# Patient Record
Sex: MALE | Race: WHITE | NOT HISPANIC OR LATINO | Employment: FULL TIME | URBAN - METROPOLITAN AREA
[De-identification: names, ages, dates, MRNs, and addresses within clinical notes are randomized per-mention and may not be internally consistent; named-entity substitution may affect disease eponyms.]

---

## 2024-01-08 ENCOUNTER — OFFICE VISIT (OUTPATIENT)
Dept: CARDIOLOGY | Facility: CLINIC | Age: 64
End: 2024-01-08
Payer: COMMERCIAL

## 2024-01-08 VITALS
HEART RATE: 55 BPM | DIASTOLIC BLOOD PRESSURE: 80 MMHG | BODY MASS INDEX: 29.8 KG/M2 | WEIGHT: 220 LBS | SYSTOLIC BLOOD PRESSURE: 124 MMHG | HEIGHT: 72 IN

## 2024-01-08 DIAGNOSIS — I10 PRIMARY HYPERTENSION: ICD-10-CM

## 2024-01-08 DIAGNOSIS — I44.0 FIRST DEGREE AV BLOCK: ICD-10-CM

## 2024-01-08 DIAGNOSIS — E78.2 MIXED HYPERLIPIDEMIA: ICD-10-CM

## 2024-01-08 DIAGNOSIS — I25.810 CORONARY ARTERY DISEASE INVOLVING CORONARY BYPASS GRAFT OF NATIVE HEART WITHOUT ANGINA PECTORIS: Primary | ICD-10-CM

## 2024-01-08 PROBLEM — R35.1 BPH ASSOCIATED WITH NOCTURIA: Status: ACTIVE | Noted: 2023-03-21

## 2024-01-08 PROBLEM — N40.1 BPH ASSOCIATED WITH NOCTURIA: Status: ACTIVE | Noted: 2023-03-21

## 2024-01-08 PROBLEM — K40.90 RIGHT INGUINAL HERNIA: Status: ACTIVE | Noted: 2023-03-21

## 2024-01-08 PROBLEM — E55.9 VITAMIN D DEFICIENCY: Status: ACTIVE | Noted: 2022-03-10

## 2024-01-08 PROCEDURE — 93000 ELECTROCARDIOGRAM COMPLETE: CPT | Performed by: NURSE PRACTITIONER

## 2024-01-08 PROCEDURE — 99214 OFFICE O/P EST MOD 30 MIN: CPT | Performed by: NURSE PRACTITIONER

## 2024-01-08 RX ORDER — BISOPROLOL FUMARATE 10 MG/1
10 TABLET, FILM COATED ORAL DAILY
Qty: 90 TABLET | Refills: 3 | Status: SHIPPED | OUTPATIENT
Start: 2024-01-08

## 2024-01-08 RX ORDER — ATORVASTATIN CALCIUM 40 MG/1
40 TABLET, FILM COATED ORAL NIGHTLY
Qty: 90 TABLET | Refills: 3 | Status: SHIPPED | OUTPATIENT
Start: 2024-01-08

## 2024-01-08 RX ORDER — AMLODIPINE BESYLATE 2.5 MG/1
2.5 TABLET ORAL DAILY
Qty: 90 TABLET | Refills: 3 | Status: SHIPPED | OUTPATIENT
Start: 2024-01-08

## 2024-01-08 RX ORDER — CHLORTHALIDONE 25 MG/1
25 TABLET ORAL DAILY
Qty: 90 TABLET | Refills: 3 | Status: SHIPPED | OUTPATIENT
Start: 2024-01-08

## 2024-01-08 RX ORDER — LOSARTAN POTASSIUM 100 MG/1
100 TABLET ORAL DAILY
Qty: 90 TABLET | Refills: 3 | Status: SHIPPED | OUTPATIENT
Start: 2024-01-08

## 2024-01-08 NOTE — PROGRESS NOTES
Date of Office Visit: 2024  Encounter Provider: PALAK Baltazar  Place of Service: [facility] CARDIOLOGY  Patient Name: ASHLIE Bob  :1960  Primary Cardiologist: Dr. Bobby Trujillo    Chief Complaint   Patient presents with    Follow-up    Coronary Artery Disease   :     HPI: ASHLIE Bob is a 63 y.o. male who presents today for annual cardiac follow-up visit.  I have reviewed his medical records.    He has been diagnosed with hypertension and hyperlipidemia.  In 2013, he was diagnosed with multivessel coronary artery disease and underwent CABG x3.  He had postoperative atrial fibrillation and underwent electrical cardioversion with a short course of amiodarone.      In , while working in [place] he had chest pain and had a normal stress test completed in [place].     Dr. Trujillo is changed him from HCTZ to chlorthalidone due to hypertension and leg swelling which helped greatly.  In , nebivolol was changed to bisoprolol due to cost.  In , amlodipine was decreased due to leg swelling and low blood pressure.    In 2019, echocardiogram showed the following: EF, mild mitral annular calcification, and trace mitral valve regurgitation.     In 2022, myocardial perfusion study was normal.  Dr. Trujillo recommended a stress test every 5 years.    He presents today for his annual cardiac follow-up visit.  He denies chest pain, shortness of air, palpitations, dizziness, syncope, or bleeding.  He reports some mild pedal edema at times.  Blood pressure and heart rate are normal.  I reviewed his last blood work.    Past Medical History:   Diagnosis Date    BPH associated with nocturia 2023    CAD (coronary artery disease)     Unstable angina 2013, CABG at [facility] in [place] by Dr. Pruitt.  CABG x 3, LIMA-LAD, SVG-PDA, left radial - diagonal.     Hyperlipidemia     Hypertension     Postoperative atrial fibrillation     after CABG, treated  with cardioversion and a brief course of amiodarone    Right inguinal hernia 03/21/2023       Past Surgical History:   Procedure Laterality Date    CARDIAC CATHETERIZATION      CORONARY ARTERY BYPASS GRAFT      HIP SURGERY Bilateral 2016       Social History     Socioeconomic History    Marital status: Single   Tobacco Use    Smoking status: Never    Smokeless tobacco: Never   Vaping Use    Vaping Use: Never used   Substance and Sexual Activity    Alcohol use: Yes     Alcohol/week: 7.0 standard drinks of alcohol     Types: 7 Cans of beer per week     Comment: PER WK/ ONE PER DAY.     Drug use: No    Sexual activity: Yes     Partners: Female     Birth control/protection: Surgical       Family History   Problem Relation Age of Onset    Hypertension Mother     Hyperlipidemia Father     Heart attack Brother 50    Atrial fibrillation Brother     Heart attack Maternal Grandfather     Heart attack Paternal Grandfather        The following portion of the patient's history were reviewed and updated as appropriate: past medical history, past surgical history, past social history, past family history, allergies, current medications, and problem list.    Review of Systems   Constitutional: Negative.   Cardiovascular:  Positive for leg swelling.   Respiratory: Negative.     Hematologic/Lymphatic: Negative.    Neurological: Negative.        Allergies   Allergen Reactions    Adhesive Tape Hives     Pt had reaction to post-op dressing after Right STACEY         Current Outpatient Medications:     amLODIPine (NORVASC) 2.5 MG tablet, Take 1 tablet by mouth Daily., Disp: 90 tablet, Rfl: 3    aspirin 81 MG tablet, Take 1 tablet by mouth Daily., Disp: , Rfl:     atorvastatin (LIPITOR) 40 MG tablet, Take 1 tablet by mouth Every Night., Disp: 90 tablet, Rfl: 3    bisoprolol (ZEBeta) 10 MG tablet, Take 1 tablet by mouth Daily., Disp: 90 tablet, Rfl: 3    chlorthalidone (HYGROTON) 25 MG tablet, Take 1 tablet by mouth Daily., Disp: 90 tablet,  "Rfl: 3    losartan (COZAAR) 100 MG tablet, Take 1 tablet by mouth Daily., Disp: 90 tablet, Rfl: 3    tamsulosin (FLOMAX) 0.4 MG capsule 24 hr capsule, Daily., Disp: , Rfl:          Objective:     Vitals:    01/08/24 1135   BP: 124/80   BP Location: Left arm   Patient Position: Sitting   Cuff Size: Adult   Pulse: 55   Weight: 99.8 kg (220 lb)   Height: 182.9 cm (72.01\")     Body mass index is 29.83 kg/m².    PHYSICAL EXAM:    Vitals Reviewed.   General Appearance: No acute distress, well developed and well nourished.   HENT: No hearing loss noted.    Respiratory: No signs of respiratory distress. Respiration rhythm and depth normal.  Clear to auscultation.   Cardiovascular:  Jugular Venous Pressure: Normal  Heart Rate and Rhythm: Normal rhythm. Bradycardic. Heart Sounds: Normal S1 and S2. No S3 or S4 noted.  Murmurs: No murmurs noted. No rubs, thrills, or gallops.   Lower Extremities: No edema noted.  Musculoskeletal: Normal movement of extremities.  Skin: General appearance normal.    Psychiatric: Patient alert and oriented to person, place, and time. Speech and behavior appropriate. Normal mood and affect.       ECG 12 Lead    Date/Time: 1/8/2024 11:36 AM  Performed by: Adrianna Gaytan APRN    Authorized by: Adrianna Gaytan APRN  Comparison: compared with previous ECG from 12/20/2022  Similar to previous ECG  Rhythm: sinus bradycardia  Rate: bradycardic  BPM: 55  Conduction: incomplete right bundle branch block, left anterior fascicular block and 1st degree AV block  ST Segments: ST segments normal  T Waves: T waves normal  QRS axis: normal    Clinical impression: abnormal EKG            Assessment:       Diagnosis Plan   1. Coronary artery disease involving coronary bypass graft of native heart without angina pectoris        2. Primary hypertension        3. Mixed hyperlipidemia        4. First degree AV block               Plan:       1.  Coronary Artery Disease: s/p CABG x3. Normal stress test in 2022. " Continue aspirin and atorvastatin.  Denies angina.  Repeat stress test in 2027, unless new symptoms occur.    2. Hypertension: Blood pressure well controlled.     3.  Hyperlipidemia: Continue atorvastatin.     4. First degree AV block and incomplete RBBB/LAFB noted on EKG.     5.  Follow-up with Dr. Trujillo in 1 year.    As always, it has been a pleasure to participate in your patient's care. Thank you.         Sincerely,         PALAK Witt  [facility] Cardiology      Dictated utilizing Dragon Dictation

## 2024-04-24 RX ORDER — AMLODIPINE BESYLATE 2.5 MG/1
2.5 TABLET ORAL DAILY
Qty: 90 TABLET | Refills: 3 | Status: SHIPPED | OUTPATIENT
Start: 2024-04-24

## 2024-06-01 ENCOUNTER — APPOINTMENT (OUTPATIENT)
Dept: CARDIOLOGY | Facility: HOSPITAL | Age: 64
End: 2024-06-01
Payer: COMMERCIAL

## 2024-06-01 ENCOUNTER — HOSPITAL ENCOUNTER (OUTPATIENT)
Facility: HOSPITAL | Age: 64
Setting detail: OBSERVATION
Discharge: HOME | End: 2024-06-01
Attending: EMERGENCY MEDICINE | Admitting: EMERGENCY MEDICINE
Payer: COMMERCIAL

## 2024-06-01 ENCOUNTER — APPOINTMENT (OUTPATIENT)
Dept: GENERAL RADIOLOGY | Facility: HOSPITAL | Age: 64
End: 2024-06-01
Payer: COMMERCIAL

## 2024-06-01 VITALS
OXYGEN SATURATION: 96 % | WEIGHT: 222.66 LBS | RESPIRATION RATE: 15 BRPM | DIASTOLIC BLOOD PRESSURE: 65 MMHG | SYSTOLIC BLOOD PRESSURE: 138 MMHG | BODY MASS INDEX: 30.16 KG/M2 | HEIGHT: 72 IN | HEART RATE: 55 BPM | TEMPERATURE: 98.1 F

## 2024-06-01 DIAGNOSIS — R73.9 HYPERGLYCEMIA: ICD-10-CM

## 2024-06-01 DIAGNOSIS — I10 HYPERTENSION, UNSPECIFIED TYPE: ICD-10-CM

## 2024-06-01 DIAGNOSIS — R07.9 CHEST PAIN, UNSPECIFIED TYPE: Primary | ICD-10-CM

## 2024-06-01 DIAGNOSIS — Z86.79 HISTORY OF CAD (CORONARY ARTERY DISEASE): ICD-10-CM

## 2024-06-01 LAB
ALBUMIN SERPL-MCNC: 4.8 G/DL (ref 3.5–5.2)
ALBUMIN/GLOB SERPL: 1.6 G/DL
ALP SERPL-CCNC: 70 U/L (ref 39–117)
ALT SERPL W P-5'-P-CCNC: 37 U/L (ref 1–41)
ANION GAP SERPL CALCULATED.3IONS-SCNC: 11 MMOL/L (ref 5–15)
AORTIC DIMENSIONLESS INDEX: 1 (DI)
AST SERPL-CCNC: 31 U/L (ref 1–40)
BASOPHILS # BLD AUTO: 0.03 10*3/MM3 (ref 0–0.2)
BASOPHILS NFR BLD AUTO: 0.5 % (ref 0–1.5)
BH CV ECHO MEAS - AO MAX PG: 5.2 MMHG
BH CV ECHO MEAS - AO MEAN PG: 3 MMHG
BH CV ECHO MEAS - AO V2 MAX: 114 CM/SEC
BH CV ECHO MEAS - AO V2 VTI: 25.8 CM
BH CV ECHO MEAS - AVA(I,D): 4.4 CM2
BH CV ECHO MEAS - EDV(CUBED): 140.6 ML
BH CV ECHO MEAS - EDV(MOD-SP2): 86 ML
BH CV ECHO MEAS - EDV(MOD-SP4): 83 ML
BH CV ECHO MEAS - EF(MOD-BP): 62.4 %
BH CV ECHO MEAS - EF(MOD-SP2): 68.6 %
BH CV ECHO MEAS - EF(MOD-SP4): 60.2 %
BH CV ECHO MEAS - ESV(CUBED): 42.9 ML
BH CV ECHO MEAS - ESV(MOD-SP2): 27 ML
BH CV ECHO MEAS - ESV(MOD-SP4): 33 ML
BH CV ECHO MEAS - FS: 32.7 %
BH CV ECHO MEAS - IVS/LVPW: 1.13 CM
BH CV ECHO MEAS - IVSD: 0.9 CM
BH CV ECHO MEAS - LAT PEAK E' VEL: 12.9 CM/SEC
BH CV ECHO MEAS - LV DIASTOLIC VOL/BSA (35-75): 37.2 CM2
BH CV ECHO MEAS - LV MASS(C)D: 156.9 GRAMS
BH CV ECHO MEAS - LV MAX PG: 4.7 MMHG
BH CV ECHO MEAS - LV MEAN PG: 3 MMHG
BH CV ECHO MEAS - LV SYSTOLIC VOL/BSA (12-30): 14.8 CM2
BH CV ECHO MEAS - LV V1 MAX: 108 CM/SEC
BH CV ECHO MEAS - LV V1 VTI: 25.1 CM
BH CV ECHO MEAS - LVIDD: 5.2 CM
BH CV ECHO MEAS - LVIDS: 3.5 CM
BH CV ECHO MEAS - LVOT AREA: 4.5 CM2
BH CV ECHO MEAS - LVOT DIAM: 2.4 CM
BH CV ECHO MEAS - LVPWD: 0.8 CM
BH CV ECHO MEAS - MED PEAK E' VEL: 9.8 CM/SEC
BH CV ECHO MEAS - MV A MAX VEL: 66.2 CM/SEC
BH CV ECHO MEAS - MV DEC SLOPE: 729 CM/SEC2
BH CV ECHO MEAS - MV DEC TIME: 0.14 SEC
BH CV ECHO MEAS - MV E MAX VEL: 89.7 CM/SEC
BH CV ECHO MEAS - MV E/A: 1.35
BH CV ECHO MEAS - MV MAX PG: 3.8 MMHG
BH CV ECHO MEAS - MV MEAN PG: 1 MMHG
BH CV ECHO MEAS - MV P1/2T: 43 MSEC
BH CV ECHO MEAS - MV V2 VTI: 28.7 CM
BH CV ECHO MEAS - MVA(P1/2T): 5.1 CM2
BH CV ECHO MEAS - MVA(VTI): 4 CM2
BH CV ECHO MEAS - PA ACC TIME: 0.17 SEC
BH CV ECHO MEAS - PA V2 MAX: 155 CM/SEC
BH CV ECHO MEAS - QP/QS: 0.97
BH CV ECHO MEAS - RV MAX PG: 1.12 MMHG
BH CV ECHO MEAS - RV V1 MAX: 52.9 CM/SEC
BH CV ECHO MEAS - RV V1 VTI: 15.6 CM
BH CV ECHO MEAS - RVOT DIAM: 3 CM
BH CV ECHO MEAS - SV(LVOT): 113.5 ML
BH CV ECHO MEAS - SV(MOD-SP2): 59 ML
BH CV ECHO MEAS - SV(MOD-SP4): 50 ML
BH CV ECHO MEAS - SV(RVOT): 110.3 ML
BH CV ECHO MEAS - SVI(LVOT): 50.9 ML/M2
BH CV ECHO MEAS - SVI(MOD-SP2): 26.4 ML/M2
BH CV ECHO MEAS - SVI(MOD-SP4): 22.4 ML/M2
BH CV ECHO MEASUREMENTS AVERAGE E/E' RATIO: 7.9
BILIRUB SERPL-MCNC: 0.9 MG/DL (ref 0–1.2)
BUN SERPL-MCNC: 16 MG/DL (ref 8–23)
BUN/CREAT SERPL: 18.2 (ref 7–25)
CALCIUM SPEC-SCNC: 9.3 MG/DL (ref 8.6–10.5)
CHLORIDE SERPL-SCNC: 99 MMOL/L (ref 98–107)
CHOLEST SERPL-MCNC: 136 MG/DL (ref 0–200)
CO2 SERPL-SCNC: 27 MMOL/L (ref 22–29)
CREAT SERPL-MCNC: 0.88 MG/DL (ref 0.76–1.27)
DEPRECATED RDW RBC AUTO: 41.3 FL (ref 37–54)
EGFRCR SERPLBLD CKD-EPI 2021: 95.4 ML/MIN/1.73
EOSINOPHIL # BLD AUTO: 0.28 10*3/MM3 (ref 0–0.4)
EOSINOPHIL NFR BLD AUTO: 4.9 % (ref 0.3–6.2)
ERYTHROCYTE [DISTWIDTH] IN BLOOD BY AUTOMATED COUNT: 12.6 % (ref 12.3–15.4)
GEN 5 2HR TROPONIN T REFLEX: 10 NG/L
GLOBULIN UR ELPH-MCNC: 3 GM/DL
GLUCOSE SERPL-MCNC: 143 MG/DL (ref 65–99)
HCT VFR BLD AUTO: 45.4 % (ref 37.5–51)
HDLC SERPL-MCNC: 40 MG/DL (ref 40–60)
HGB BLD-MCNC: 15.7 G/DL (ref 13–17.7)
HOLD SPECIMEN: NORMAL
HOLD SPECIMEN: NORMAL
IMM GRANULOCYTES # BLD AUTO: 0.01 10*3/MM3 (ref 0–0.05)
IMM GRANULOCYTES NFR BLD AUTO: 0.2 % (ref 0–0.5)
LDLC SERPL CALC-MCNC: 75 MG/DL (ref 0–100)
LDLC/HDLC SERPL: 1.84 {RATIO}
LEFT ATRIUM VOLUME INDEX: 26.5 ML/M2
LYMPHOCYTES # BLD AUTO: 1.43 10*3/MM3 (ref 0.7–3.1)
LYMPHOCYTES NFR BLD AUTO: 25.1 % (ref 19.6–45.3)
MCH RBC QN AUTO: 31.5 PG (ref 26.6–33)
MCHC RBC AUTO-ENTMCNC: 34.6 G/DL (ref 31.5–35.7)
MCV RBC AUTO: 91.2 FL (ref 79–97)
MONOCYTES # BLD AUTO: 0.46 10*3/MM3 (ref 0.1–0.9)
MONOCYTES NFR BLD AUTO: 8.1 % (ref 5–12)
NEUTROPHILS NFR BLD AUTO: 3.49 10*3/MM3 (ref 1.7–7)
NEUTROPHILS NFR BLD AUTO: 61.2 % (ref 42.7–76)
NRBC BLD AUTO-RTO: 0 /100 WBC (ref 0–0.2)
PLATELET # BLD AUTO: 245 10*3/MM3 (ref 140–450)
PMV BLD AUTO: 10 FL (ref 6–12)
POTASSIUM SERPL-SCNC: 4.1 MMOL/L (ref 3.5–5.2)
PROT SERPL-MCNC: 7.8 G/DL (ref 6–8.5)
RBC # BLD AUTO: 4.98 10*6/MM3 (ref 4.14–5.8)
SINUS: 3.8 CM
SODIUM SERPL-SCNC: 137 MMOL/L (ref 136–145)
TRIGL SERPL-MCNC: 112 MG/DL (ref 0–150)
TROPONIN T DELTA: -2 NG/L
TROPONIN T SERPL HS-MCNC: 12 NG/L
VLDLC SERPL-MCNC: 21 MG/DL (ref 5–40)
WBC NRBC COR # BLD AUTO: 5.7 10*3/MM3 (ref 3.4–10.8)
WHOLE BLOOD HOLD COAG: NORMAL
WHOLE BLOOD HOLD SPECIMEN: NORMAL

## 2024-06-01 PROCEDURE — G0378 HOSPITAL OBSERVATION PER HR: HCPCS

## 2024-06-01 PROCEDURE — 99285 EMERGENCY DEPT VISIT HI MDM: CPT

## 2024-06-01 PROCEDURE — 84484 ASSAY OF TROPONIN QUANT: CPT

## 2024-06-01 PROCEDURE — 93005 ELECTROCARDIOGRAM TRACING: CPT

## 2024-06-01 PROCEDURE — 80061 LIPID PANEL: CPT | Performed by: EMERGENCY MEDICINE

## 2024-06-01 PROCEDURE — 93306 TTE W/DOPPLER COMPLETE: CPT | Performed by: INTERNAL MEDICINE

## 2024-06-01 PROCEDURE — 80053 COMPREHEN METABOLIC PANEL: CPT

## 2024-06-01 PROCEDURE — 99214 OFFICE O/P EST MOD 30 MIN: CPT | Performed by: INTERNAL MEDICINE

## 2024-06-01 PROCEDURE — 93306 TTE W/DOPPLER COMPLETE: CPT

## 2024-06-01 PROCEDURE — 85025 COMPLETE CBC W/AUTO DIFF WBC: CPT

## 2024-06-01 PROCEDURE — 93005 ELECTROCARDIOGRAM TRACING: CPT | Performed by: EMERGENCY MEDICINE

## 2024-06-01 PROCEDURE — 25510000001 PERFLUTREN (DEFINITY) 8.476 MG IN SODIUM CHLORIDE (PF) 0.9 % 10 ML INJECTION: Performed by: EMERGENCY MEDICINE

## 2024-06-01 PROCEDURE — 93010 ELECTROCARDIOGRAM REPORT: CPT | Performed by: INTERNAL MEDICINE

## 2024-06-01 PROCEDURE — 36415 COLL VENOUS BLD VENIPUNCTURE: CPT

## 2024-06-01 PROCEDURE — 71045 X-RAY EXAM CHEST 1 VIEW: CPT

## 2024-06-01 RX ORDER — AMLODIPINE BESYLATE 5 MG/1
2.5 TABLET ORAL DAILY
Status: DISCONTINUED | OUTPATIENT
Start: 2024-06-01 | End: 2024-06-01 | Stop reason: HOSPADM

## 2024-06-01 RX ORDER — BISACODYL 10 MG
10 SUPPOSITORY, RECTAL RECTAL DAILY PRN
Status: DISCONTINUED | OUTPATIENT
Start: 2024-06-01 | End: 2024-06-01 | Stop reason: HOSPADM

## 2024-06-01 RX ORDER — SODIUM CHLORIDE 0.9 % (FLUSH) 0.9 %
10 SYRINGE (ML) INJECTION EVERY 12 HOURS SCHEDULED
Status: DISCONTINUED | OUTPATIENT
Start: 2024-06-01 | End: 2024-06-01 | Stop reason: HOSPADM

## 2024-06-01 RX ORDER — POLYETHYLENE GLYCOL 3350 17 G/17G
17 POWDER, FOR SOLUTION ORAL DAILY PRN
Status: DISCONTINUED | OUTPATIENT
Start: 2024-06-01 | End: 2024-06-01 | Stop reason: HOSPADM

## 2024-06-01 RX ORDER — SODIUM CHLORIDE 0.9 % (FLUSH) 0.9 %
10 SYRINGE (ML) INJECTION AS NEEDED
Status: DISCONTINUED | OUTPATIENT
Start: 2024-06-01 | End: 2024-06-01 | Stop reason: HOSPADM

## 2024-06-01 RX ORDER — SODIUM CHLORIDE 9 MG/ML
40 INJECTION, SOLUTION INTRAVENOUS AS NEEDED
Status: DISCONTINUED | OUTPATIENT
Start: 2024-06-01 | End: 2024-06-01 | Stop reason: HOSPADM

## 2024-06-01 RX ORDER — ATORVASTATIN CALCIUM 20 MG/1
40 TABLET, FILM COATED ORAL NIGHTLY
Status: DISCONTINUED | OUTPATIENT
Start: 2024-06-01 | End: 2024-06-01 | Stop reason: SDUPTHER

## 2024-06-01 RX ORDER — ATORVASTATIN CALCIUM 80 MG/1
80 TABLET, FILM COATED ORAL NIGHTLY
Status: DISCONTINUED | OUTPATIENT
Start: 2024-06-01 | End: 2024-06-01 | Stop reason: HOSPADM

## 2024-06-01 RX ORDER — BISACODYL 5 MG/1
5 TABLET, DELAYED RELEASE ORAL DAILY PRN
Status: DISCONTINUED | OUTPATIENT
Start: 2024-06-01 | End: 2024-06-01 | Stop reason: HOSPADM

## 2024-06-01 RX ORDER — NITROGLYCERIN 0.4 MG/1
0.4 TABLET SUBLINGUAL
Status: DISCONTINUED | OUTPATIENT
Start: 2024-06-01 | End: 2024-06-01 | Stop reason: HOSPADM

## 2024-06-01 RX ORDER — AMLODIPINE BESYLATE 2.5 MG/1
5 TABLET ORAL DAILY
Qty: 90 TABLET | Refills: 3 | Status: SHIPPED | OUTPATIENT
Start: 2024-06-01

## 2024-06-01 RX ORDER — AMOXICILLIN 250 MG
2 CAPSULE ORAL 2 TIMES DAILY
Status: DISCONTINUED | OUTPATIENT
Start: 2024-06-01 | End: 2024-06-01 | Stop reason: HOSPADM

## 2024-06-01 RX ORDER — ASPIRIN 325 MG
325 TABLET ORAL ONCE
Status: COMPLETED | OUTPATIENT
Start: 2024-06-01 | End: 2024-06-01

## 2024-06-01 RX ORDER — ASPIRIN 81 MG/1
81 TABLET ORAL DAILY
Status: DISCONTINUED | OUTPATIENT
Start: 2024-06-02 | End: 2024-06-01 | Stop reason: HOSPADM

## 2024-06-01 RX ORDER — LOSARTAN POTASSIUM 100 MG/1
100 TABLET ORAL DAILY
Status: DISCONTINUED | OUTPATIENT
Start: 2024-06-01 | End: 2024-06-01 | Stop reason: HOSPADM

## 2024-06-01 RX ORDER — ATORVASTATIN CALCIUM 80 MG/1
80 TABLET, FILM COATED ORAL NIGHTLY
Qty: 90 TABLET | Refills: 0 | Status: SHIPPED | OUTPATIENT
Start: 2024-06-01

## 2024-06-01 RX ORDER — BISOPROLOL FUMARATE 5 MG/1
10 TABLET, FILM COATED ORAL DAILY
Status: DISCONTINUED | OUTPATIENT
Start: 2024-06-01 | End: 2024-06-01 | Stop reason: HOSPADM

## 2024-06-01 RX ORDER — CHLORTHALIDONE 25 MG/1
25 TABLET ORAL DAILY
Status: DISCONTINUED | OUTPATIENT
Start: 2024-06-01 | End: 2024-06-01 | Stop reason: HOSPADM

## 2024-06-01 RX ADMIN — PERFLUTREN 2 ML: 6.52 INJECTION, SUSPENSION INTRAVENOUS at 14:15

## 2024-06-01 RX ADMIN — Medication 10 ML: at 09:56

## 2024-06-01 RX ADMIN — ASPIRIN 325 MG: 325 TABLET ORAL at 06:39

## 2024-06-01 NOTE — ED PROVIDER NOTES
EMERGENCY DEPARTMENT ENCOUNTER  Room Number:  10/10  Date of encounter:  6/1/2024  PCP: Giana Houser APRN  Patient Care Team:  Giana Houser APRN as PCP - General (Nurse Practitioner)  Bobby Trujillo MD as Cardiologist (Cardiology)     HPI:  Context: Eduard Bob is a 63 y.o. male who presents to the ED c/o chief complaint of chest pain.  Patient complains of intermittent chest pain for the last week, complains of a chest tightness, coming and going, pain is exertional, no associated shortness of breath, does endorse some radiation to his neck, no nausea vomiting or diaphoresis.  Patient denies any chest pain at present, reports last episode approximately 5 AM.    MEDICAL HISTORY REVIEW  Reviewed in EPIC    PAST MEDICAL HISTORY  Active Ambulatory Problems     Diagnosis Date Noted    CAD (coronary artery disease)     Hypertension     Hyperlipidemia     First degree AV block 09/14/2020    BPH associated with nocturia 03/21/2023    Right inguinal hernia 03/21/2023    Vitamin D deficiency 03/10/2022     Resolved Ambulatory Problems     Diagnosis Date Noted    No Resolved Ambulatory Problems     Past Medical History:   Diagnosis Date    Diabetes mellitus     Postoperative atrial fibrillation        PAST SURGICAL HISTORY  Past Surgical History:   Procedure Laterality Date    CARDIAC CATHETERIZATION      CORONARY ARTERY BYPASS GRAFT      HIP SURGERY Bilateral 2016       FAMILY HISTORY  Family History   Problem Relation Age of Onset    Hypertension Mother     Hyperlipidemia Father     Heart attack Brother 50    Atrial fibrillation Brother     Heart attack Maternal Grandfather     Heart attack Paternal Grandfather        SOCIAL HISTORY  Social History     Socioeconomic History    Marital status: Single   Tobacco Use    Smoking status: Never    Smokeless tobacco: Never   Vaping Use    Vaping status: Never Used   Substance and Sexual Activity    Alcohol use: Yes     Alcohol/week: 7.0 standard drinks of alcohol      Types: 7 Cans of beer per week     Comment: PER WK/ ONE PER DAY.     Drug use: No    Sexual activity: Yes     Partners: Female     Birth control/protection: Surgical       ALLERGIES  Adhesive tape    The patient's allergies have been reviewed    REVIEW OF SYSTEMS  All systems reviewed and negative except for those discussed in HPI.     PHYSICAL EXAM  I have reviewed the triage vital signs and nursing notes.  ED Triage Vitals [06/01/24 0623]   Temp Heart Rate Resp BP SpO2   97.4 °F (36.3 °C) 72 16 (!) 191/103 96 %      Temp src Heart Rate Source Patient Position BP Location FiO2 (%)   Tympanic Monitor -- -- --       General: No acute distress.  HENT: NCAT, PERRL, Nares patent.  Eyes: no scleral icterus.  Neck: trachea midline, no ROM limitations.  CV: regular rhythm, regular rate.  Respiratory: normal effort, CTAB.  Abdomen: soft, nondistended, NTTP, no rebound tenderness, no guarding or rigidity.  Musculoskeletal: no deformity.  Neuro: alert, moves all extremities, follows commands.  Skin: warm, dry.    LAB RESULTS  Recent Results (from the past 24 hour(s))   ECG 12 Lead ED Triage Standing Order; Chest Pain    Collection Time: 06/01/24  6:28 AM   Result Value Ref Range    QT Interval 453 ms    QTC Interval 457 ms   Comprehensive Metabolic Panel    Collection Time: 06/01/24  6:50 AM    Specimen: Blood   Result Value Ref Range    Glucose 143 (H) 65 - 99 mg/dL    BUN 16 8 - 23 mg/dL    Creatinine 0.88 0.76 - 1.27 mg/dL    Sodium 137 136 - 145 mmol/L    Potassium 4.1 3.5 - 5.2 mmol/L    Chloride 99 98 - 107 mmol/L    CO2 27.0 22.0 - 29.0 mmol/L    Calcium 9.3 8.6 - 10.5 mg/dL    Total Protein 7.8 6.0 - 8.5 g/dL    Albumin 4.8 3.5 - 5.2 g/dL    ALT (SGPT) 37 1 - 41 U/L    AST (SGOT) 31 1 - 40 U/L    Alkaline Phosphatase 70 39 - 117 U/L    Total Bilirubin 0.9 0.0 - 1.2 mg/dL    Globulin 3.0 gm/dL    A/G Ratio 1.6 g/dL    BUN/Creatinine Ratio 18.2 7.0 - 25.0    Anion Gap 11.0 5.0 - 15.0 mmol/L    eGFR 95.4 >60.0  mL/min/1.73   High Sensitivity Troponin T    Collection Time: 06/01/24  6:50 AM    Specimen: Blood   Result Value Ref Range    HS Troponin T 12 <22 ng/L   Green Top (Gel)    Collection Time: 06/01/24  6:50 AM   Result Value Ref Range    Extra Tube Hold for add-ons.    Lavender Top    Collection Time: 06/01/24  6:50 AM   Result Value Ref Range    Extra Tube hold for add-on    Gold Top - SST    Collection Time: 06/01/24  6:50 AM   Result Value Ref Range    Extra Tube Hold for add-ons.    Light Blue Top    Collection Time: 06/01/24  6:50 AM   Result Value Ref Range    Extra Tube Hold for add-ons.    CBC Auto Differential    Collection Time: 06/01/24  6:50 AM    Specimen: Blood   Result Value Ref Range    WBC 5.70 3.40 - 10.80 10*3/mm3    RBC 4.98 4.14 - 5.80 10*6/mm3    Hemoglobin 15.7 13.0 - 17.7 g/dL    Hematocrit 45.4 37.5 - 51.0 %    MCV 91.2 79.0 - 97.0 fL    MCH 31.5 26.6 - 33.0 pg    MCHC 34.6 31.5 - 35.7 g/dL    RDW 12.6 12.3 - 15.4 %    RDW-SD 41.3 37.0 - 54.0 fl    MPV 10.0 6.0 - 12.0 fL    Platelets 245 140 - 450 10*3/mm3    Neutrophil % 61.2 42.7 - 76.0 %    Lymphocyte % 25.1 19.6 - 45.3 %    Monocyte % 8.1 5.0 - 12.0 %    Eosinophil % 4.9 0.3 - 6.2 %    Basophil % 0.5 0.0 - 1.5 %    Immature Grans % 0.2 0.0 - 0.5 %    Neutrophils, Absolute 3.49 1.70 - 7.00 10*3/mm3    Lymphocytes, Absolute 1.43 0.70 - 3.10 10*3/mm3    Monocytes, Absolute 0.46 0.10 - 0.90 10*3/mm3    Eosinophils, Absolute 0.28 0.00 - 0.40 10*3/mm3    Basophils, Absolute 0.03 0.00 - 0.20 10*3/mm3    Immature Grans, Absolute 0.01 0.00 - 0.05 10*3/mm3    nRBC 0.0 0.0 - 0.2 /100 WBC       I ordered the above labs and reviewed the results.    RADIOLOGY  XR Chest 1 View    Result Date: 6/1/2024  XR CHEST 1 VW-  HISTORY: Male who is 63 years-old, chest pain  TECHNIQUE: Frontal view of the chest  COMPARISON: None available  FINDINGS: The heart size is normal. Sternotomy wires are present. Pulmonary vasculature is unremarkable. No focal pulmonary  consolidation, pleural effusion, or pneumothorax. No acute osseous process.      No focal pulmonary consolidation. Follow-up as clinical indications persist.  This report was finalized on 6/1/2024 6:53 AM by Dr. Ayan Solano M.D on Workstation: [id number]       I ordered the above noted radiological studies. I reviewed the images and results. I agree with the radiologist interpretation.    PROCEDURES  Procedures    MEDICATIONS GIVEN IN ER  Medications   sodium chloride 0.9 % flush 10 mL (has no administration in time range)   aspirin tablet 325 mg (325 mg Oral Given 6/1/24 0639)       PROGRESS, DATA ANALYSIS, CONSULTS, AND MEDICAL DECISION MAKING  A complete history and physical exam have been performed.  All available laboratory and imaging results have been reviewed by myself prior to disposition.    MDM    After the initial H&P, I discussed pertinent information from history and physical exam with patient/family.  Discussed differential diagnosis.  Discussed plan for ED evaluation/workup/treatment.  All questions answered.  Patient/family is agreeable with plan.  ED Course as of 06/01/24 0812   Sat Jun 01, 2024   0658 My differential diagnosis for chest pain includes but is not limited to:  Muscle strain, costochondritis, myositis, pleurisy, rib fracture, intercostal neuritis, herpes zoster, tumor, myocardial infarction, coronary syndrome, unstable angina, angina, aortic dissection, mitral valve prolapse, pericarditis, palpitations, pulmonary embolus, pneumonia, pneumothorax, lung cancer, GERD, esophagitis, esophageal spasm     [JG]   0700 EKG independently viewed and contemporaneously interpreted by ED physician. Time: 1828.  Rate 61.  Interpretation: Normal sinus rhythm, left axis deviation, first-degree AV block, right bundle branch block, inferior Q waves, minimal ST elevation in inferior leads, no ST depression or T wave inversion.   [JG]   0700 When compared with prior EKG on 1/8/2024, no acute changes  are present.  Minimal ST elevation in inferior leads was present on prior EKG. [JG]   0700 Reviewed chest x-ray in PACS, no pulmonary infiltrates per my read. [JG]   0702 Review of prior external notes (non-ED) -and- Review of prior external test results outside of this encounter:   I reviewed patient's cardiology office visit note from December 20, 2022, patient with history of coronary artery disease status post CABG, had postoperative atrial fibrillation treated with cardioversion.  Patient's last stress test available in epic is from 1/12/2022, EF of 59%, normal perfusion study with no evidence of ischemia. [JG]   0709 EKG unchanged from prior, patient currently symptom-free. [JG]   0806 Patient with history of coronary artery disease status post CABG presents with exertional chest pain, currently asymptomatic, EKG unchanged from prior, initial high-sensitivity troponin negative, chest x-ray unremarkable, ED workup unremarkable other than mild hyperglycemia.  Patient's status post aspirin.  Heart score intermediate risk.  Plan for admission for cardiology consultation and further evaluation. [JG]   0810 Phone call with Eliane, MARIIA with [facility].  Discussed the patient, relevant history, exam, diagnostics, ED findings/progress, and concerns. They agree to admit the patient to telemetry observation under Dr. Cortés. Care assumed by the admitting physician at this time.     [JG]   0812 Patient reassessed.  Discussed ED findings, differential diagnosis, and the need for admission for evaluation/treatment.  They are agreeable to admission and all questions were answered.     [JG]      ED Course User Index  [JG] Apollo Barros MD       AS OF 08:12 EDT VITALS:    BP - 165/91  HR - 52  TEMP - 97.4 °F (36.3 °C) (Tympanic)  O2 SATS - 97%    DIAGNOSIS  Final diagnoses:   Chest pain, unspecified type   Hypertension, unspecified type   Hyperglycemia   History of CAD (coronary artery disease)          DISPOSITION  ADMISSION    Discussed treatment plan and reason for admission with pt/family and admitting physician.  Pt/family voiced understanding of the plan for admission for further testing/treatment as needed.        Apollo Barros MD  06/01/24 0812

## 2024-06-01 NOTE — CONSULTS
"      [facility]    Patient Name: Eduard Bob  :1960  63 y.o.  LOS: 0  Encounter Provider: Harley Leger MD      Patient Care Team:  Giana Houser APRN as PCP - General (Nurse Practitioner)  Bobby Trujillo MD as Cardiologist (Cardiology)    Chief Complaint/Consult question: History of CABG, atypical chest pain    PMH/HPI: This is a 63-year-old male with PMH of CAD s/p CABG in  ([place], [place]), hypertension, hyperlipidemia, obesity who presents with atypical chest pain.    Patient usually follows Dr. Braden in clinic and was last seen in 10/2023 with no significant complaints.  He presented after having some intermittent chest pain over the past week.  He works as a  and is otherwise quite physically active and works out in the gym most days.  He does report significant stress recently with family related illness and has noticed increase in his BP readings especially in the afternoon of late.  ED workup showed grossly normal EKG with RVH pattern which is not new and negative serial high-sensitivity troponin x 2.    At the time of interview, patient was in no acute distress and denied active chest pain.    Objective   Vital Signs  Temp:  [97.4 °F (36.3 °C)-98.1 °F (36.7 °C)] 98.1 °F (36.7 °C)  Heart Rate:  [52-72] 55  Resp:  [14-16] 15  BP: (138-191)/() 138/65  No intake or output data in the 24 hours ending 24 1355  Flowsheet Rows      Flowsheet Row First Filed Value   Admission Height 182.9 cm (72\") Documented at 2024 0623   Admission Weight 95.3 kg (210 lb) Documented at 2024 0623              Vitals and nursing note reviewed.   Constitutional:       Appearance: Healthy appearance. Not in distress.   Neck:      Vascular: No JVR.   Pulmonary:      Effort: Pulmonary effort is normal.      Breath sounds: Normal breath sounds. No wheezing. No rhonchi. No rales.   Cardiovascular:      Normal rate. Regular rhythm.      Murmurs: " There is no murmur.   Edema:     Peripheral edema absent.   Abdominal:      General: There is no distension.      Palpations: Abdomen is soft.      Tenderness: There is no abdominal tenderness.   Musculoskeletal:      Cervical back: Neck supple. Skin:     General: Skin is cool.   Neurological:      Mental Status: Alert and oriented to person, place and time.           Pertinent Test Results:  Results from last 7 days   Lab Units 06/01/24  0650   SODIUM mmol/L 137   POTASSIUM mmol/L 4.1   CHLORIDE mmol/L 99   CO2 mmol/L 27.0   BUN mg/dL 16   CREATININE mg/dL 0.88   GLUCOSE mg/dL 143*   CALCIUM mg/dL 9.3   AST (SGOT) U/L 31   ALT (SGPT) U/L 37     Results from last 7 days   Lab Units 06/01/24  0845 06/01/24  0650   HSTROP T ng/L 10 12     Results from last 7 days   Lab Units 06/01/24  0650   WBC 10*3/mm3 5.70   HEMOGLOBIN g/dL 15.7   HEMATOCRIT % 45.4   PLATELETS 10*3/mm3 245             Results from last 7 days   Lab Units 06/01/24  0650   CHOLESTEROL mg/dL 136   TRIGLYCERIDES mg/dL 112   HDL CHOL mg/dL 40                   Medication Review:   amLODIPine, 2.5 mg, Oral, Daily  [START ON 6/2/2024] aspirin, 81 mg, Oral, Daily  atorvastatin, 80 mg, Oral, Nightly  bisoprolol, 10 mg, Oral, Daily  chlorthalidone, 25 mg, Oral, Daily  losartan, 100 mg, Oral, Daily  senna-docusate sodium, 2 tablet, Oral, BID  sodium chloride, 10 mL, Intravenous, Q12H              Assessment & Plan     Active Hospital Problems    Diagnosis  POA    **Chest pain [R07.9]  Yes      Resolved Hospital Problems   No resolved problems to display.        Chest pain: Patient reports intermittent chest pain over the past week in the setting of elevated BP and significantly increased family stress.  This is not exertional on history as he is otherwise quite physically active and has not noticed significant difference during his workouts.  Echo today showed normal biventricular function and no convincing regional wall motion abnormalities.  Last NM stress  test in 12/2021 was low risk.  Okay to discharge from our perspective with close monitoring and clinic follow-up.  See below regarding BP and lipid control.  CAD s/p CABG: Hx of CABG x 3 (LIMA-LAD, SVG-PDA, free left radial-diagonal) at [facility] in [place], [place].   Hypertension: Quite hypertensive initially with improvement after reinitiation of home meds.  He does notice the morning BP to be normal but increasingly more elevated in the afternoon.  Continue home bisoprolol 10 daily, chlorthalidone 25 daily, losartan 100 daily, will cautiously increase amlodipine to 5 daily and monitor for lower extremity swelling which she has had in the past.  Hyperlipidemia: Lipids this admission showed HDL 40, LDL 75, not at goal given history of CABG.  On atorvastatin 40 daily, will increase to 80 daily and monitor clinically.  Obesity: BMI 30.    Thank you for involving us in the care of Mr. Bob.  Cardiology will sign off at this time but please do not hesitate to reach back out to us if additional questions arise.    Harley Leger MD  [facility]  06/01/24  13:55 EDT

## 2024-06-01 NOTE — PLAN OF CARE
Goal Outcome Evaluation:patient admitted to obs unit at 0930 w cp level 1. Chest pain at 1000 was zero. Cardiology eval/tx . Echo and 12 lead and trop levels drawn. Cleared to discharge. Patient states understanding of all discharge instructions and opted to dress self and ambulate ad yamilet to leave facility for discharge.

## 2024-06-01 NOTE — PROGRESS NOTES
I have reviewed this documentation and agree.  MD ATTESTATION NOTE    SHARED VISIT: This visit was performed by BOTH a physician and an APC. The substantive portion of the medical decision making was performed by this attesting physician who made or approved the management plan and takes responsibility for patient management. All studies in the APC note (if performed) were independently interpreted by me.     The MARIIA and I have discussed this patient's history, physical exam, and treatment plan.  I have reviewed the documentation and personally had a face to face interaction with the patient. I affirm the documentation and agree with the treatment and plan.  The attached note describes my personal findings.    I provided a substantive portion of the care of the patient.  I personally performed the physical exam in its entirety, and below are my findings.      Brief HPI: 63-year-old male admitted to the observation unit for further evaluation of intermittent chest pain over the past week in the setting of known coronary artery disease status post bypass surgery.  Patient has had no return of chest pressure or tightness.  He denies any shortness of breath, nausea, palpitations, syncope or near syncope.  Patient eager for cardiology evaluation so he can go home if negative.     General : Pleasant cooperative and conversant well-appearing male  HEENT: NCAT, eomi, no scleral icterus  Neck: supple, trachea midline  CV: nl s1 and s2  Resp: CTAB with no wheezes nor rhonchi  Exts: no obvious deformities, no le edema, no calf ttp  Neuro: alert and appropriate, face symmetric, nl speech, moves all 4 exts equally     Assessment and Plan: 63-year-old male with history of coronary artery disease, hypertension, BPH, hyperlipidemia followed by Dr. Trujillo as an outpatient admitted to the observation unit for further evaluation of intermittent chest pain over the past week.    Intermittent chest tightness x 1 week -currently pain-free,  EKG with no changes from prior, initial troponin 12, plan to trend troponin, telemetry monitoring, cardiology consultation pending  Hypertension -on losartan 100 mg, amlodipine 2.5 mg, chlorthalidone 25 mg, bisoprolol 10 mg; plan to continue to monitor blood pressure on telemetry  Hyperlipidemia -patient on Lipitor 40 mg as outpatient  BPH -on Flomax daily as an outpatient

## 2024-06-01 NOTE — DISCHARGE SUMMARY
"ED OBSERVATION PROGRESS/DISCHARGE SUMMARY    Date of Admission: 6/1/2024   LOS: 0 days   PCP: Giana Houser APRN    Final Diagnosis chest pain      Subjective     Hospital Outcome: Eduard Bob is a 63 y.o. male with past medical history of CAD status post CABG 3 vessels, hypertension, hyperlipidemia, BPH, and history of A-fib status post CABG who is being admitted for chest pain workup.  Patient reports that he has had intermittent episodes of chest \"heaviness\" that is located on the left side of his chest without any radiation that lasts for about 10-30 minutes.  Patient denies any associated symptoms with it; specifically denying any dyspnea, lightheadedness, vertigo, peripheral edema, or nausea or vomiting.  Patient states that his father has been in the hospital this past week and it has been very stressful but he wanted to get checked given his cardiac history.  Patient states that he worked out 3 times this past week without any episodes occurring during exertion; patient also reports that he golfed 18 holes Thursday and Friday without any episodes during that time either.     6/1/2024: Patient's troponins continue to trend negative.  Echo showed normal LV systolic function and normal LV diastolic function and no significant valvular abnormalities.  Cardiology saw and evaluated the patient no further inpatient testing recommended at this time.  Suspicion that the patient's significantly elevated blood pressure on admission is contributory to his chest pain.  Cardiology recommends increasing the patient's home amlodipine to 5 mg and increasing his home statin to 80 mg.    All labs and imaging findings discussed with patient as well as specialist recommendations.  Patient is agreeable and eager for discharge home at this time.    ROS:  General: no fevers, chills  Respiratory: no cough, dyspnea  Cardiovascular: no chest pain, palpitations  Abdomen: No abdominal pain, nausea, vomiting, or " diarrhea  Neurologic: No focal weakness    Objective   Physical Exam:  I have reviewed the vital signs.  Temp:  [97.4 °F (36.3 °C)-98.1 °F (36.7 °C)] 98.1 °F (36.7 °C)  Heart Rate:  [52-72] 55  Resp:  [14-16] 15  BP: (138-191)/() 138/65  General Appearance:    Alert, cooperative, no distress  Head:    Normocephalic, atraumatic  Eyes:    Sclerae anicteric  Neck:   Supple, no mass  Lungs: Clear to auscultation bilaterally, respirations unlabored  Heart: Regular rate and rhythm, S1 and S2 normal, no murmur, rub or gallop  Abdomen:  Soft, non-tender, bowel sounds active, nondistended  Extremities: No clubbing, cyanosis, or edema to lower extremities  Pulses:  2+ and symmetric in distal lower extremities  Skin: No rashes   Neurologic: Oriented x3, Normal strength to extremities    Results Review:    I have reviewed the labs, radiology results and diagnostic studies.    Results from last 7 days   Lab Units 06/01/24  0650   WBC 10*3/mm3 5.70   HEMOGLOBIN g/dL 15.7   HEMATOCRIT % 45.4   PLATELETS 10*3/mm3 245     Results from last 7 days   Lab Units 06/01/24  0650   SODIUM mmol/L 137   POTASSIUM mmol/L 4.1   CHLORIDE mmol/L 99   CO2 mmol/L 27.0   BUN mg/dL 16   CREATININE mg/dL 0.88   CALCIUM mg/dL 9.3   BILIRUBIN mg/dL 0.9   ALK PHOS U/L 70   ALT (SGPT) U/L 37   AST (SGOT) U/L 31   GLUCOSE mg/dL 143*     Imaging Results (Last 24 Hours)       Procedure Component Value Units Date/Time    XR Chest 1 View [[id number]] Collected: 06/01/24 0653     Updated: 06/01/24 0656    Narrative:      XR CHEST 1 VW-     HISTORY: Male who is 63 years-old, chest pain     TECHNIQUE: Frontal view of the chest     COMPARISON: None available     FINDINGS: The heart size is normal. Sternotomy wires are present.  Pulmonary vasculature is unremarkable. No focal pulmonary consolidation,  pleural effusion, or pneumothorax. No acute osseous process.       Impression:      No focal pulmonary consolidation. Follow-up as clinical  indications  persist.     This report was finalized on 6/1/2024 6:53 AM by Dr. Ayan Solano M.D on Workstation: [id number]               I have reviewed the medications.  ---------------------------------------------------------------------------------------------  Assessment & Plan   Assessment/Problem List    Chest pain      Plan:  Chest pain/pressure:  -History of CAD status post CABG 3 vessels  -Heart score 4  -Initial troponin 12; trended negative  -EKG reviewed and shows sinus rhythm with chronic changes and does not appear unchanged from previous EKGs  -Chest x-ray negative for any acute cardiopulmonary issues  -Echo showed normal LV systolic and diastolic functions and no acute valvular dysfunction  -Cardiology consulted and suspicious for elevated blood pressure is underlying issue, recommends increasing amlodipine to 5 mg daily and increasing home statin to 80 mg daily  -Continue home aspirin and statin     Chronic hypertension  Chronic hyperlipidemia:  -Continue home regimen     BPH:  -Continue home Flomax    Disposition: Discharged to home    Follow-up after Discharge: PCP in 1 to 2 weeks, cardiology at next regularly scheduled appointment.    This note will serve as a discharge summary    Eliane Melara PA-C 06/01/24 15:00 EDT    I have worn appropriate PPE during this patient encounter, sanitized my hands both with entering and exiting patient's room.      34 minutes has been spent by [facility] providers in the care of this patient while under observation status

## 2024-06-01 NOTE — H&P
"[facility] [place]    Patient Name: Eduard oBb  : 1960  MRN: [id number]  Primary Care Physician:  Giana Houser APRN  Date of admission: 2024    Subjective   Subjective     Chief Complaint:   Chief Complaint   Patient presents with    Chest Pain         HPI:    Eduard Bob is a 63 y.o. male with past medical history of CAD status post CABG 3 vessels, hypertension, hyperlipidemia, BPH, and history of A-fib status post CABG who is being admitted for chest pain workup.  Patient reports that he has had intermittent episodes of chest \"heaviness\" that is located on the left side of his chest without any radiation that lasts for about 10-30 minutes.  Patient denies any associated symptoms with it; specifically denying any dyspnea, lightheadedness, vertigo, peripheral edema, or nausea or vomiting.  Patient states that his father has been in the hospital this past week and it has been very stressful but he wanted to get checked given his cardiac history.  Patient states that he worked out 3 times this past week without any episodes occurring during exertion; patient also reports that he golfed 18 holes Thursday and Friday without any episodes during that time either.      Review of Systems   All systems were reviewed and negative except for: Chest pressure    Personal History     Past Medical History:   Diagnosis Date    BPH associated with nocturia 2023    CAD (coronary artery disease)     Unstable angina 2013, CABG at [facility] in [place] by Dr. Pruitt.  CABG x 3, LIMA-LAD, SVG-PDA, left radial - diagonal.     Diabetes mellitus     Hyperlipidemia     Hypertension     Postoperative atrial fibrillation     after CABG, treated with cardioversion and a brief course of amiodarone       Past Surgical History:   Procedure Laterality Date    CARDIAC CATHETERIZATION      CORONARY ARTERY BYPASS GRAFT      HIP SURGERY Bilateral 2016       Family History: family history includes " Atrial fibrillation in his brother; Heart attack in his maternal grandfather and paternal grandfather; Heart attack (age of onset: 50) in his brother; Hyperlipidemia in his father; Hypertension in his mother. Otherwise pertinent FHx was reviewed and not pertinent to current issue.    Social History:  reports that he has never smoked. He has never used smokeless tobacco. He reports current alcohol use of about 7.0 standard drinks of alcohol per week. He reports that he does not use drugs.    Home Medications:  amLODIPine, aspirin, atorvastatin, bisoprolol, chlorthalidone, losartan, and tamsulosin    Allergies:  Allergies   Allergen Reactions    Adhesive Tape Hives     Pt had reaction to post-op dressing after Right STACEY       Objective   Objective     Vitals:   Temp:  [97.4 °F (36.3 °C)] 97.4 °F (36.3 °C)  Heart Rate:  [52-72] 52  Resp:  [16] 16  BP: (165-191)/() 165/91  Physical Exam    Constitutional: Awake, alert, well-groomed healthy appearing male   Eyes: PERRL, sclerae anicteric, no conjunctival injection, EOMI   HENT: NCAT, mucous membranes moist, normal hearing   Neck: Supple, nontender, trachea midline   Respiratory: Clear to auscultation bilaterally, nonlabored respirations on room air and speaks in full sentences   Cardiovascular: RRR, no murmurs, palpable pedal pulses bilaterally, no reproducible pain on palpation   Gastrointestinal: Positive bowel sounds, soft, nontender, nondistended   Musculoskeletal: No bilateral ankle edema, no clubbing or cyanosis to extremities   Psychiatric: Appropriate affect, cooperative   Neurologic: Oriented x 3, strength symmetric in all extremities, Cranial Nerves grossly intact to confrontation, speech clear   Skin: No rashes; decreased hair growth from ankle to mid calf on bilateral lower extremities consistent with likely PVD/PAD chronic changes    Result Review    Result Review:  I have personally reviewed the results from the time of this admission to 6/1/2024  08:19 EDT and agree with these findings:  [x]  Laboratory list / accordion  []  Microbiology  [x]  Radiology  [x]  EKG/Telemetry   []  Cardiology/Vascular   []  Pathology  []  Old records  []  Other:  Most notable findings include: Initial troponin 12, potassium 4.1, serum creatinine 0.88, lipid panel within normal limits, normal WBC 5.7, and normal hemoglobin 15.7.  Chest x-ray shows no focal pulmonary consolidation.  EKG shows sinus rhythm, that showed chronic first-degree AV block and incomplete RBBB, and incomplete LAFB; mild ST elevation in inferior leads but overall EKG appears unchanged from previous EKGs.      Assessment & Plan   Assessment / Plan     Brief Patient Summary:  Eduard Bob is a 63 y.o. male who is being admitted for chest pain rule out    Active Hospital Problems:  Active Hospital Problems    Diagnosis     **Chest pain      Plan:   Chest pain/pressure:  -History of CAD status post CABG 3 vessels  -Heart score 4  -Initial troponin 12; continue to trend  -EKG reviewed and shows sinus rhythm with chronic changes and does not appear unchanged from previous EKGs  -Continuous cardiac monitor  -Chest x-ray negative for any acute cardiopulmonary issues  -Echo ordered  -Cardiology consulted  -Continue home aspirin and statin    Chronic hypertension  Chronic hyperlipidemia:  -Continue home regimen    BPH:  -Continue home Flomax      DVT prophylaxis:  Mechanical DVT prophylaxis orders are present.        CODE STATUS:    Level Of Support Discussed With: Patient  Code Status (Patient has no pulse and is not breathing): CPR (Attempt to Resuscitate)  Medical Interventions (Patient has pulse or is breathing): Full Support    Admission Status:  I believe this patient meets observation status.    Electronically signed by Eliane Melara PA-C, 06/01/24, 8:19 AM EDT.        75 minutes has been spent by [facility] providers in the care of this patient while under observation  status      I have worn appropriate PPE during this patient encounter, sanitized my hands both with entering and exiting patient's room.    I have discussed plan of care with patient including advance care plan and/or surrogate decision maker.  Patient advises that their wife Megan will be their primary surrogate decision maker

## 2024-06-01 NOTE — ED NOTES
Nursing report ED to floor  Eduard Bob  63 y.o.  male    HPI :  HPI (Adult)  Stated Reason for Visit: pt arrives via PV for chest tightness/pain that has worsened today. Pt states the pain is on the left side towards the neck and radiates down to the left arm  History Obtained From: patient    Chief Complaint  Chief Complaint   Patient presents with    Chest Pain       Admitting doctor:   Najma Marques MD    Admitting diagnosis:   The primary encounter diagnosis was Chest pain, unspecified type. Diagnoses of Hypertension, unspecified type, Hyperglycemia, and History of CAD (coronary artery disease) were also pertinent to this visit.    Code status:   Current Code Status       Date Active Code Status Order ID Comments User Context       Not on file            Allergies:   Adhesive tape    Isolation:   No active isolations    Intake and Output  No intake or output data in the 24 hours ending 06/01/24 0820    Weight:       06/01/24  0623   Weight: 95.3 kg (210 lb)       Most recent vitals:   Vitals:    06/01/24 0725 06/01/24 0726 06/01/24 0727 06/01/24 0728   BP:       Pulse: 53 52 54 52   Resp:       Temp:       TempSrc:       SpO2: 97% 98% 95% 97%   Weight:       Height:           Active LDAs/IV Access:   Lines, Drains & Airways       Active LDAs       Name Placement date Placement time Site Days    Peripheral IV 06/01/24 0650 Right Antecubital 06/01/24  0650  Antecubital  less than 1                    Labs (abnormal labs have a star):   Labs Reviewed   COMPREHENSIVE METABOLIC PANEL - Abnormal; Notable for the following components:       Result Value    Glucose 143 (*)     All other components within normal limits    Narrative:     GFR Normal >60  Chronic Kidney Disease <60  Kidney Failure <15     TROPONIN - Normal    Narrative:     High Sensitive Troponin T Reference Range:  <14.0 ng/L- Negative Female for AMI  <22.0 ng/L- Negative Male for AMI  >=14 - Abnormal Female indicating possible myocardial  injury.  >=22 - Abnormal Male indicating possible myocardial injury.   Clinicians would have to utilize clinical acumen, EKG, Troponin, and serial changes to determine if it is an Acute Myocardial Infarction or myocardial injury due to an underlying chronic condition.        CBC WITH AUTO DIFFERENTIAL - Normal   RAINBOW DRAW    Narrative:     The following orders were created for panel order [place] Draw.  Procedure                               Abnormality         Status                     ---------                               -----------         ------                     Green Top (Gel)[[id number]]                                  Final result               Lavender Top[[id number]]                                     Final result               Gold Top - SST[[id number]]                                   Final result               Light Blue Top[[id number]]                                   Final result                 Please view results for these tests on the individual orders.   HIGH SENSITIVITIY TROPONIN T 2HR   LIPID PANEL   CBC AND DIFFERENTIAL    Narrative:     The following orders were created for panel order CBC & Differential.  Procedure                               Abnormality         Status                     ---------                               -----------         ------                     CBC Auto Differential[[id number]]        Normal              Final result                 Please view results for these tests on the individual orders.   GREEN TOP   LAVENDER TOP   GOLD TOP - SST   LIGHT BLUE TOP       EKG:   ECG 12 Lead ED Triage Standing Order; Chest Pain   Preliminary Result   HEART RATE= 61  bpm   RR Interval= 984  ms   CO Interval= 231  ms   P Horizontal Axis=   deg   P Front Axis= -27  deg   QRSD Interval= 101  ms   QT Interval= 453  ms   QTcB= 457  ms   QRS Axis= -47  deg   T Wave Axis= 65  deg   - ABNORMAL ECG -   Sinus rhythm   Prolonged CO interval   Incomplete RBBB and LAFB   Abnormal  R-wave progression, early transition   ST elevation, consider inferior injury   Electronically Signed By:    Date and Time of Study: 2024-06-01 06:28:16      ECG 12 Lead Chest Pain    (Results Pending)   ECG 12 Lead Chest Pain    (Results Pending)   ECG 12 Lead Chest Pain    (Results Pending)       Meds given in ED:   Medications   sodium chloride 0.9 % flush 10 mL (has no administration in time range)   nitroglycerin (NITROSTAT) SL tablet 0.4 mg (has no administration in time range)   sodium chloride 0.9 % flush 10 mL (has no administration in time range)   sodium chloride 0.9 % flush 10 mL (has no administration in time range)   sodium chloride 0.9 % infusion 40 mL (has no administration in time range)   sennosides-docusate (PERICOLACE) 8.6-50 MG per tablet 2 tablet (has no administration in time range)     And   polyethylene glycol (MIRALAX) packet 17 g (has no administration in time range)     And   bisacodyl (DULCOLAX) EC tablet 5 mg (has no administration in time range)     And   bisacodyl (DULCOLAX) suppository 10 mg (has no administration in time range)   aspirin tablet 325 mg (325 mg Oral Given 6/1/24 0639)       Imaging results:  XR Chest 1 View    Result Date: 6/1/2024  No focal pulmonary consolidation. Follow-up as clinical indications persist.  This report was finalized on 6/1/2024 6:53 AM by Dr. Ayan Solano M.D on Workstation: [facility]       Ambulatory status:   - up ad yamilet      Social issues:   Social History     Socioeconomic History    Marital status: Single   Tobacco Use    Smoking status: Never    Smokeless tobacco: Never   Vaping Use    Vaping status: Never Used   Substance and Sexual Activity    Alcohol use: Yes     Alcohol/week: 7.0 standard drinks of alcohol     Types: 7 Cans of beer per week     Comment: PER WK/ ONE PER DAY.     Drug use: No    Sexual activity: Yes     Partners: Female     Birth control/protection: Surgical       Peripheral Neurovascular  Peripheral Neurovascular  (Adult)  Peripheral Neurovascular WDL: .WDL except, neurovascular assessment lower, pulse assessment  Pulse Assessment: posterior tibial, radial  LLE Neurovascular Assessment  Temperature LLE: warm  Color LLE: no discoloration  Sensation LLE: numbness present, tingling present (L 3 fingers)  RLE Neurovascular Assessment  Temperature RLE: warm  Color RLE: no discoloration  Sensation RLE: no numbness, no tingling    Neuro Cognitive  Neuro Cognitive (Adult)  Cognitive/Neuro/Behavioral WDL: WDL, orientation  Orientation: oriented x 4    Learning  Learning Assessment (Adult)  Learning Readiness and Ability: no barriers identified    Respiratory  Respiratory WDL  Respiratory WDL: WDL, rhythm/pattern  Rhythm/Pattern, Respiratory: no shortness of breath reported, depth regular, pattern regular, unlabored    Abdominal Pain       Pain Assessments  Pain (Adult)  (0-10) Pain Rating: Rest: 3  Response to Pain Interventions: interventions effective per patient    NIH Stroke Scale       Isiah Thompson RN  06/01/24 08:20 EDT

## 2024-06-03 LAB
QT INTERVAL: 453 MS
QT INTERVAL: 469 MS
QTC INTERVAL: 445 MS
QTC INTERVAL: 457 MS

## 2024-06-14 ENCOUNTER — OFFICE VISIT (OUTPATIENT)
Dept: CARDIOLOGY | Facility: CLINIC | Age: 64
End: 2024-06-14
Payer: COMMERCIAL

## 2024-06-14 VITALS
SYSTOLIC BLOOD PRESSURE: 120 MMHG | HEART RATE: 66 BPM | BODY MASS INDEX: 30.52 KG/M2 | DIASTOLIC BLOOD PRESSURE: 80 MMHG | WEIGHT: 218 LBS | HEIGHT: 71 IN | OXYGEN SATURATION: 98 %

## 2024-06-14 DIAGNOSIS — I10 PRIMARY HYPERTENSION: ICD-10-CM

## 2024-06-14 DIAGNOSIS — E78.2 MIXED HYPERLIPIDEMIA: ICD-10-CM

## 2024-06-14 DIAGNOSIS — I25.810 CORONARY ARTERY DISEASE INVOLVING CORONARY BYPASS GRAFT OF NATIVE HEART WITHOUT ANGINA PECTORIS: Primary | ICD-10-CM

## 2024-06-14 PROCEDURE — 99214 OFFICE O/P EST MOD 30 MIN: CPT | Performed by: NURSE PRACTITIONER

## 2024-06-14 PROCEDURE — 93000 ELECTROCARDIOGRAM COMPLETE: CPT | Performed by: NURSE PRACTITIONER

## 2024-06-14 NOTE — PROGRESS NOTES
Date of Office Visit: 2024  Encounter Provider: PALAK Peña  Place of Service: [facility] CARDIOLOGY  Patient Name: Eduard Bob  :1960    Chief Complaint   Patient presents with    Coronary Artery Disease   :     HPI: Eduard Bob is a 63 y.o. male patient of Dr. Dewey with hypertension, hyperlipidemia, and coronary artery disease.  In , he underwent a CABG x 3.    He was last seen in the office by PALAK Villaseñor in January at which time he was doing well.  He reported mild intermittent pedal edema.  No changes were made to his regimen, and he was advised to follow-up in 1 year.    On , he presented to the ED with chest pain.  EKG was nonischemic.  Troponins were negative.  Echocardiogram demonstrated normal LV function, no wall motion abnormalities, and no significant valvular abnormalities.  We saw him in consultation.  No further testing was recommended.  He was quite hypertensive on arrival for which amlodipine was increased.  The atorvastatin was also increased to 80 mg.  He is here today for follow-up.    He denies any further chest pain.  It was actually more of an intermittent tightness on his left side which he thinks was stress related.  He has a lot on his plate.  He has been dealing with a chronically ill daughter.  In addition to that his father who lives in [facility] recently fell so he was driving back and forth from [place].  He denies any shortness of breath, palpitations, edema, dizziness, or syncope.  Of note, he did not increase the amlodipine.  He reports a history of swelling in the past with higher doses.  His blood pressures have been in the 110s and 120s at home.  Occasionally in the 140s in the evening.  He has not yet increased the atorvastatin dose either.    Past Medical History:   Diagnosis Date    BPH associated with nocturia 2023    CAD (coronary artery disease)     Unstable angina 2013, CABG at [facility] by Dr. Pruitt.  CABG x 3, LIMA-LAD, SVG-PDA, left radial - diagonal.     Diabetes mellitus     Hyperlipidemia     Hypertension     Postoperative atrial fibrillation     after CABG, treated with cardioversion and a brief course of amiodarone       Past Surgical History:   Procedure Laterality Date    CARDIAC CATHETERIZATION      CORONARY ARTERY BYPASS GRAFT      HIP SURGERY Bilateral 2016       Social History     Socioeconomic History    Marital status: Single   Tobacco Use    Smoking status: Never    Smokeless tobacco: Never   Vaping Use    Vaping status: Never Used   Substance and Sexual Activity    Alcohol use: Yes     Alcohol/week: 7.0 standard drinks of alcohol     Types: 7 Cans of beer per week     Comment: PER WK/ ONE PER DAY.     Drug use: No    Sexual activity: Yes     Partners: Female     Birth control/protection: Surgical       Family History   Problem Relation Age of Onset    Hypertension Mother     Hyperlipidemia Father     Heart attack Brother 50    Atrial fibrillation Brother     Heart attack Maternal Grandfather     Heart attack Paternal Grandfather        Review of Systems   Constitutional: Negative.   Cardiovascular: Negative.  Negative for chest pain, dyspnea on exertion, leg swelling, orthopnea, paroxysmal nocturnal dyspnea and syncope.   Respiratory: Negative.     Hematologic/Lymphatic: Negative for bleeding problem.   Musculoskeletal:  Negative for falls.   Gastrointestinal:  Negative for melena.   Neurological:  Negative for dizziness and light-headedness.       Allergies   Allergen Reactions    Adhesive Tape Hives     Pt had reaction to post-op dressing after Right STACEY         Current Outpatient Medications:     amLODIPine (NORVASC) 2.5 MG tablet, Take 2 tablets by mouth Daily. (Patient taking differently: Take 1 tablet by mouth Daily.), Disp: 90 tablet, Rfl: 3    aspirin 81 MG tablet, Take 1 tablet by mouth Daily., Disp: , Rfl:     atorvastatin (LIPITOR) 80 MG tablet,  "Take 1 tablet by mouth Every Night., Disp: 90 tablet, Rfl: 0    bisoprolol (ZEBeta) 10 MG tablet, Take 1 tablet by mouth Daily., Disp: 90 tablet, Rfl: 3    chlorthalidone (HYGROTON) 25 MG tablet, Take 1 tablet by mouth Daily., Disp: 90 tablet, Rfl: 3    losartan (COZAAR) 100 MG tablet, Take 1 tablet by mouth Daily., Disp: 90 tablet, Rfl: 3      Objective:     Vitals:    06/14/24 1013   BP: 120/80   Pulse: 66   SpO2: 98%   Weight: 98.9 kg (218 lb)   Height: 180.3 cm (71\")     Body mass index is 30.4 kg/m².    PHYSICAL EXAM:    Neck:      Vascular: No JVD.   Pulmonary:      Effort: Pulmonary effort is normal.      Breath sounds: Normal breath sounds.   Cardiovascular:      Normal rate. Regular rhythm.      Murmurs: There is no murmur.      No gallop.  No click. No rub.   Pulses:     Intact distal pulses.           ECG 12 Lead    Date/Time: 6/14/2024 10:24 AM  Performed by: Jenna Sparks APRN    Authorized by: Jenna Sparks APRN  Comparison: compared with previous ECG from 6/1/2024  Similar to previous ECG  Rhythm: sinus rhythm  Rate: normal  BPM: 62            Assessment:       Diagnosis Plan   1. Coronary artery disease involving coronary bypass graft of native heart without angina pectoris  ECG 12 Lead      2. Primary hypertension        3. Mixed hyperlipidemia          Orders Placed This Encounter   Procedures    ECG 12 Lead     This order was created via procedure documentation     Order Specific Question:   Release to patient     Answer:   Routine Release [[id number]]          Plan:       1.  Coronary artery disease.  He denies any symptoms of angina.  Continue aspirin and atorvastatin.      2.  Hypertension.  His blood pressure looks great today.  I think it is fine to continue the original dose of 2.5 mg of amlodipine.      3.  Hyperlipidemia.  Recent lipid panel demonstrated an LDL of 75.  I suspect this was the reason for the increased dose of atorvastatin.  New data is recommending driving the " LDL under 55, so I think this is reasonable.      I think he is doing well.  I am not making any changes.  He will follow-up with Dr. Trujillo in January as scheduled.      As always, it has been a pleasure to participate in your patient's care.      Sincerely,         PALAK Gallegos

## 2025-01-21 RX ORDER — BISOPROLOL FUMARATE 10 MG/1
10 TABLET, FILM COATED ORAL DAILY
Qty: 90 TABLET | Refills: 0 | Status: SHIPPED | OUTPATIENT
Start: 2025-01-21

## 2025-01-21 RX ORDER — CHLORTHALIDONE 25 MG/1
25 TABLET ORAL DAILY
Qty: 90 TABLET | Refills: 0 | Status: SHIPPED | OUTPATIENT
Start: 2025-01-21

## 2025-02-03 RX ORDER — LOSARTAN POTASSIUM 100 MG/1
100 TABLET ORAL DAILY
Qty: 90 TABLET | Refills: 1 | Status: SHIPPED | OUTPATIENT
Start: 2025-02-03

## 2025-02-20 NOTE — PROGRESS NOTES
RM:________     PCP: Giana Houser APRN    : 1960  AGE: 64 y.o.  EST PATIENT     REASON FOR VISIT/  CC:        BP Readings from Last 3 Encounters:   24 120/80   24 138/65   24 124/80      Wt Readings from Last 3 Encounters:   24 98.9 kg (218 lb)   24 101 kg (222 lb 10.6 oz)   24 99.8 kg (220 lb)        WT: ____________ BP: __________L __________R HR______    CHEST PAIN: _____________    SOA: _____________PALPS: _______________     LIGHTHEADED: ___________FATIGUE: ________________ EDEMA __________    ALLERGIES:Adhesive tape SMOKING HISTORY:  Social History     Tobacco Use    Smoking status: Never    Smokeless tobacco: Never   Vaping Use    Vaping status: Never Used   Substance Use Topics    Alcohol use: Yes     Alcohol/week: 7.0 standard drinks of alcohol     Types: 7 Cans of beer per week     Comment: PER WK/ ONE PER DAY.     Drug use: No     CAFFEINE USE_________________  ALCOHOL ______________________

## 2025-02-24 ENCOUNTER — OFFICE VISIT (OUTPATIENT)
Dept: CARDIOLOGY | Facility: CLINIC | Age: 65
End: 2025-02-24
Payer: COMMERCIAL

## 2025-02-24 VITALS
WEIGHT: 210 LBS | HEIGHT: 71 IN | SYSTOLIC BLOOD PRESSURE: 120 MMHG | BODY MASS INDEX: 29.4 KG/M2 | DIASTOLIC BLOOD PRESSURE: 84 MMHG | HEART RATE: 53 BPM

## 2025-02-24 DIAGNOSIS — I25.810 CORONARY ARTERY DISEASE INVOLVING CORONARY BYPASS GRAFT OF NATIVE HEART WITHOUT ANGINA PECTORIS: Primary | ICD-10-CM

## 2025-02-24 DIAGNOSIS — I10 PRIMARY HYPERTENSION: ICD-10-CM

## 2025-02-24 DIAGNOSIS — E78.2 MIXED HYPERLIPIDEMIA: ICD-10-CM

## 2025-02-24 PROBLEM — R07.9 CHEST PAIN: Status: RESOLVED | Noted: 2024-06-01 | Resolved: 2025-02-24

## 2025-02-24 PROCEDURE — 99214 OFFICE O/P EST MOD 30 MIN: CPT | Performed by: INTERNAL MEDICINE

## 2025-02-24 PROCEDURE — 93000 ELECTROCARDIOGRAM COMPLETE: CPT | Performed by: INTERNAL MEDICINE

## 2025-02-24 RX ORDER — AMLODIPINE BESYLATE 2.5 MG/1
2.5 TABLET ORAL DAILY
Start: 2025-02-24

## 2025-02-24 RX ORDER — ATORVASTATIN CALCIUM 40 MG/1
40 TABLET, FILM COATED ORAL NIGHTLY
COMMUNITY

## 2025-02-24 RX ORDER — BISOPROLOL FUMARATE 5 MG/1
5 TABLET, FILM COATED ORAL DAILY
Qty: 90 TABLET | Refills: 3 | Status: SHIPPED | OUTPATIENT
Start: 2025-02-24 | End: 2025-02-25 | Stop reason: SDUPTHER

## 2025-02-24 NOTE — LETTER
2025     PALAK Easley  [address]   [address]  [facility] [address]    Patient: Eduard Bob   YOB: 1960   Date of Visit: 2025       Dear PALAK Easley    Eduard Bob was in my office today. Below is a copy of my note.    If you have questions, please do not hesitate to call me. I look forward to following Eduard along with you.         Sincerely,        Bobby Trujillo MD        CC: No Recipients    Date of Office Visit: 2025  Encounter Provider: Bobby Trujillo MD  Place of Service: [facility] CARDIOLOGY  Patient Name: Eduard Bob  :1960    Chief Complaint   Patient presents with   • Coronary Artery Disease   :     HPI: Eduard Bob is a 64 y.o. male who presents today in follow up.  He established care with me in 2018.  He travels a lot for work, and he previously lived in [place] and [place].      In 2013, he had an episode of exertional neck discomfort that resolved with rest.  One month later it happened again and he went to [facility] in [place].  He was subsequently diagnosed with multivessel CAD with preserved LV systolic function and underwent CABG x 3 (LIMA-LAD, SVG-PDA, free left radial-diagonal).  He had some atrial fibrillation postoperatively, which was treated with cardioversion and a short course of amiodarone.      In , while working in [place], he developed atypical chest pain.  He was observed and then underwent stress testing in [place] that was normal per his report.    In , he was seen by Dr. Dubois in [place].      When I met him, he was doing well.  I changed him from HCTZ to chlorthalidone due to hypertension and leg swelling, and this helped greatly.  In , I changed him from nebivolol to bisoprolol due to cost.  He tolerated this change well.  In , his amlodipine dose was decreased due to leg swelling and low blood pressure.    He was  observed in the ED in June 2024 for atypical chest pain. No ischemic testing was recommended. His amlodipine and atorvastatin doses were increased but he continued his usual doses.     As he had atypical symptoms prior to presentation, I have recommended he have a stress test every five years; he had a normal perfusion stress in January 2022.     For the most part, he is doing well. He denies orthopnea, PND, palpitations, syncope, or chest/neck discomfort. He reports intermittent dyspnea, but never with exercising at the gym, and he climbed all 12 sets of stairs to our office today without limitation. His BP is often low at home, with SBP 90 mm Hg. When that happens, he experiences orthostatic symptoms.     Past Medical History:   Diagnosis Date   • BPH associated with nocturia 03/21/2023   • CAD (coronary artery disease)     Unstable angina 6/2013, CABG at [facility] in [place] by Dr. Pruitt.  CABG x 3, LIMA-LAD, SVG-PDA, left radial - diagonal.    • Diabetes mellitus    • Hyperlipidemia    • Hypertension    • Postoperative atrial fibrillation     after CABG, treated with cardioversion and a brief course of amiodarone       Past Surgical History:   Procedure Laterality Date   • CARDIAC CATHETERIZATION     • CORONARY ARTERY BYPASS GRAFT     • HIP SURGERY Bilateral 2016       Social History     Socioeconomic History   • Marital status: Single   Tobacco Use   • Smoking status: Never     Passive exposure: Never   • Smokeless tobacco: Never   • Tobacco comments:     OCCASIONAL CIGAR ONCE OR TWICE PER YEAR.    Vaping Use   • Vaping status: Never Used   Substance and Sexual Activity   • Alcohol use: Yes     Alcohol/week: 7.0 standard drinks of alcohol     Types: 7 Cans of beer per week     Comment: PER WK/ ONE PER DAY.    • Drug use: No   • Sexual activity: Yes     Partners: Female     Birth control/protection: Surgical       Family History   Problem Relation Age of Onset   • Hypertension Mother    • Hyperlipidemia  "Father    • Heart attack Brother 50   • Atrial fibrillation Brother    • Heart attack Maternal Grandfather    • Heart attack Paternal Grandfather        Review of Systems   Constitutional: Negative for malaise/fatigue.   Cardiovascular:  Negative for chest pain and palpitations.   Respiratory:  Negative for shortness of breath.    Neurological:  Negative for dizziness and light-headedness.   All other systems reviewed and are negative.      Allergies   Allergen Reactions   • Adhesive Tape Hives     Pt had reaction to post-op dressing after Right STACEY         Current Outpatient Medications:   •  amLODIPine (NORVASC) 2.5 MG tablet, Take 2 tablets by mouth Daily. (Patient taking differently: Take 1 tablet by mouth Daily.), Disp: 90 tablet, Rfl: 3  •  aspirin 81 MG tablet, Take 1 tablet by mouth Daily., Disp: , Rfl:   •  atorvastatin (LIPITOR) 40 MG tablet, Take 1 tablet by mouth Every Night., Disp: , Rfl:   •  bisoprolol (ZEBeta) 10 MG tablet, Take 1 tablet by mouth daily., Disp: 90 tablet, Rfl: 0  •  chlorthalidone (HYGROTON) 25 MG tablet, Take 1 tablet by mouth daily., Disp: 90 tablet, Rfl: 0  •  losartan (COZAAR) 100 MG tablet, TAKE 1 TABLET BY MOUTH DAILY, Disp: 90 tablet, Rfl: 1  •  atorvastatin (LIPITOR) 80 MG tablet, Take 1 tablet by mouth Every Night. (Patient not taking: Reported on 2/24/2025), Disp: 90 tablet, Rfl: 0      Objective:     Vitals:    02/24/25 0931   BP: 120/84   BP Location: Left arm   Pulse: 53   Weight: 95.3 kg (210 lb)   Height: 180.3 cm (71\")       Body mass index is 29.29 kg/m².    Physical Exam  Vitals reviewed.   Constitutional:       Appearance: He is well-developed.   HENT:      Head: Normocephalic.      Nose: Nose normal.      Mouth/Throat:      Pharynx: Oropharynx is clear.   Eyes:      Conjunctiva/sclera: Conjunctivae normal.   Neck:      Vascular: No JVD.   Cardiovascular:      Rate and Rhythm: Normal rate and regular rhythm.      Pulses: Normal pulses and intact distal pulses.      " Heart sounds: Normal heart sounds. No murmur heard.     Comments: his S2 seems to be very prominently split.  Pulmonary:      Effort: Pulmonary effort is normal.      Breath sounds: Normal breath sounds.   Abdominal:      Palpations: Abdomen is soft.      Tenderness: There is no abdominal tenderness.   Musculoskeletal:         General: No swelling. Normal range of motion.      Cervical back: Normal range of motion.   Lymphadenopathy:      Cervical: No cervical adenopathy.   Skin:     General: Skin is warm and dry.   Neurological:      General: No focal deficit present.      Mental Status: He is alert.   Psychiatric:         Mood and Affect: Mood normal.           ECG 12 Lead    Date/Time: 2/24/2025 9:40 AM  Performed by: Bobby Trujillo MD    Authorized by: Bobby Trujillo MD  Comparison: compared with previous ECG   Similar to previous ECG  Rhythm: sinus rhythm  Conduction: 1st degree AV block  ST Segments: ST segments normal  T inversion: V2 and V1  QRS axis: left    Clinical impression: non-specific ECG            Assessment:       Diagnosis Plan   1. Coronary artery disease involving coronary bypass graft of native heart without angina pectoris        2. Mixed hyperlipidemia        3. Primary hypertension                 Plan:       1/2.  Coronary Artery Disease  Assessment  • The patient has no angina    Subjective - Objective  • There is a history of previous coronary artery bypass graft  6/2013  • Current antiplatelet therapy includes aspirin 81 mg    He's on atorvastatin.     Since his CAD was a bit of a surprise given his previously low risk status, and since he's young with an expected long life span, I feel that stress testing every 5 years is reasonable (next in 2027). Also, his symptoms prior to presentation were atypical.     3.  His BP and HR are sometimes too low. I have cut back on his bisoprolol dose to 5mg daily.     Sincerely,       Bobby Trujillo MD

## 2025-02-24 NOTE — PROGRESS NOTES
Date of Office Visit: 2025  Encounter Provider: Bobby Trujillo MD  Place of Service: [facility] CARDIOLOGY  Patient Name: Eduard Bob  :1960    Chief Complaint   Patient presents with    Coronary Artery Disease   :     HPI: Eduard Bob is a 64 y.o. male who presents today in follow up.  He established care with me in 2018.  He travels a lot for work, and he previously lived in [place] and [place].      In 2013, he had an episode of exertional neck discomfort that resolved with rest.  One month later it happened again and he went to [facility] in [place].  He was subsequently diagnosed with multivessel CAD with preserved LV systolic function and underwent CABG x 3 (LIMA-LAD, SVG-PDA, free left radial-diagonal).  He had some atrial fibrillation postoperatively, which was treated with cardioversion and a short course of amiodarone.      In , while working in [place], he developed atypical chest pain.  He was observed and then underwent stress testing in [place] that was normal per his report.    In , he was seen by Dr. Dubois in [place].      When I met him, he was doing well.  I changed him from HCTZ to chlorthalidone due to hypertension and leg swelling, and this helped greatly.  In , I changed him from nebivolol to bisoprolol due to cost.  He tolerated this change well.  In , his amlodipine dose was decreased due to leg swelling and low blood pressure.    He was observed in the ED in 2024 for atypical chest pain. No ischemic testing was recommended. His amlodipine and atorvastatin doses were increased but he continued his usual doses.     As he had atypical symptoms prior to presentation, I have recommended he have a stress test every five years; he had a normal perfusion stress in 2022.     For the most part, he is doing well. He denies orthopnea, PND, palpitations, syncope, or chest/neck discomfort. He  reports intermittent dyspnea, but never with exercising at the gym, and he climbed all 12 sets of stairs to our office today without limitation. His BP is often low at home, with SBP 90 mm Hg. When that happens, he experiences orthostatic symptoms.     Past Medical History:   Diagnosis Date    BPH associated with nocturia 03/21/2023    CAD (coronary artery disease)     Unstable angina 6/2013, CABG at [facility] in [place] by Dr. Pruitt.  CABG x 3, LIMA-LAD, SVG-PDA, left radial - diagonal.     Diabetes mellitus     Hyperlipidemia     Hypertension     Postoperative atrial fibrillation     after CABG, treated with cardioversion and a brief course of amiodarone       Past Surgical History:   Procedure Laterality Date    CARDIAC CATHETERIZATION      CORONARY ARTERY BYPASS GRAFT      HIP SURGERY Bilateral 2016       Social History     Socioeconomic History    Marital status: Single   Tobacco Use    Smoking status: Never     Passive exposure: Never    Smokeless tobacco: Never    Tobacco comments:     OCCASIONAL CIGAR ONCE OR TWICE PER YEAR.    Vaping Use    Vaping status: Never Used   Substance and Sexual Activity    Alcohol use: Yes     Alcohol/week: 7.0 standard drinks of alcohol     Types: 7 Cans of beer per week     Comment: PER WK/ ONE PER DAY.     Drug use: No    Sexual activity: Yes     Partners: Female     Birth control/protection: Surgical       Family History   Problem Relation Age of Onset    Hypertension Mother     Hyperlipidemia Father     Heart attack Brother 50    Atrial fibrillation Brother     Heart attack Maternal Grandfather     Heart attack Paternal Grandfather        Review of Systems   Constitutional: Negative for malaise/fatigue.   Cardiovascular:  Negative for chest pain and palpitations.   Respiratory:  Negative for shortness of breath.    Neurological:  Negative for dizziness and light-headedness.   All other systems reviewed and are negative.      Allergies   Allergen Reactions    Adhesive  "Tape Hives     Pt had reaction to post-op dressing after Right STACEY         Current Outpatient Medications:     amLODIPine (NORVASC) 2.5 MG tablet, Take 2 tablets by mouth Daily. (Patient taking differently: Take 1 tablet by mouth Daily.), Disp: 90 tablet, Rfl: 3    aspirin 81 MG tablet, Take 1 tablet by mouth Daily., Disp: , Rfl:     atorvastatin (LIPITOR) 40 MG tablet, Take 1 tablet by mouth Every Night., Disp: , Rfl:     bisoprolol (ZEBeta) 10 MG tablet, Take 1 tablet by mouth daily., Disp: 90 tablet, Rfl: 0    chlorthalidone (HYGROTON) 25 MG tablet, Take 1 tablet by mouth daily., Disp: 90 tablet, Rfl: 0    losartan (COZAAR) 100 MG tablet, TAKE 1 TABLET BY MOUTH DAILY, Disp: 90 tablet, Rfl: 1    atorvastatin (LIPITOR) 80 MG tablet, Take 1 tablet by mouth Every Night. (Patient not taking: Reported on 2/24/2025), Disp: 90 tablet, Rfl: 0      Objective:     Vitals:    02/24/25 0931   BP: 120/84   BP Location: Left arm   Pulse: 53   Weight: 95.3 kg (210 lb)   Height: 180.3 cm (71\")       Body mass index is 29.29 kg/m².    Physical Exam  Vitals reviewed.   Constitutional:       Appearance: He is well-developed.   HENT:      Head: Normocephalic.      Nose: Nose normal.      Mouth/Throat:      Pharynx: Oropharynx is clear.   Eyes:      Conjunctiva/sclera: Conjunctivae normal.   Neck:      Vascular: No JVD.   Cardiovascular:      Rate and Rhythm: Normal rate and regular rhythm.      Pulses: Normal pulses and intact distal pulses.      Heart sounds: Normal heart sounds. No murmur heard.     Comments: his S2 seems to be very prominently split.  Pulmonary:      Effort: Pulmonary effort is normal.      Breath sounds: Normal breath sounds.   Abdominal:      Palpations: Abdomen is soft.      Tenderness: There is no abdominal tenderness.   Musculoskeletal:         General: No swelling. Normal range of motion.      Cervical back: Normal range of motion.   Lymphadenopathy:      Cervical: No cervical adenopathy.   Skin:     " General: Skin is warm and dry.   Neurological:      General: No focal deficit present.      Mental Status: He is alert.   Psychiatric:         Mood and Affect: Mood normal.           ECG 12 Lead    Date/Time: 2/24/2025 9:40 AM  Performed by: Bobby Trujillo MD    Authorized by: Bobby Trujillo MD  Comparison: compared with previous ECG   Similar to previous ECG  Rhythm: sinus rhythm  Conduction: 1st degree AV block  ST Segments: ST segments normal  T inversion: V2 and V1  QRS axis: left    Clinical impression: non-specific ECG            Assessment:       Diagnosis Plan   1. Coronary artery disease involving coronary bypass graft of native heart without angina pectoris        2. Mixed hyperlipidemia        3. Primary hypertension                 Plan:       1/2.  Coronary Artery Disease  Assessment   The patient has no angina    Subjective - Objective   There is a history of previous coronary artery bypass graft  6/2013   Current antiplatelet therapy includes aspirin 81 mg    He's on atorvastatin.     Since his CAD was a bit of a surprise given his previously low risk status, and since he's young with an expected long life span, I feel that stress testing every 5 years is reasonable (next in 2027). Also, his symptoms prior to presentation were atypical.     3.  His BP and HR are sometimes too low. I have cut back on his bisoprolol dose to 5mg daily.     Sincerely,       Bobby Trujillo MD

## 2025-02-25 RX ORDER — BISOPROLOL FUMARATE 5 MG/1
5 TABLET, FILM COATED ORAL DAILY
Qty: 90 TABLET | Refills: 3 | Status: SHIPPED | OUTPATIENT
Start: 2025-02-25

## 2025-03-24 ENCOUNTER — PATIENT MESSAGE (OUTPATIENT)
Dept: CARDIOLOGY | Age: 65
End: 2025-03-24
Payer: COMMERCIAL

## 2025-03-24 RX ORDER — ATORVASTATIN CALCIUM 40 MG/1
40 TABLET, FILM COATED ORAL NIGHTLY
Qty: 90 TABLET | Refills: 1 | Status: SHIPPED | OUTPATIENT
Start: 2025-03-24

## 2025-04-17 RX ORDER — CHLORTHALIDONE 25 MG/1
25 TABLET ORAL DAILY
Qty: 90 TABLET | Refills: 0 | Status: SHIPPED | OUTPATIENT
Start: 2025-04-17